# Patient Record
Sex: FEMALE | Race: BLACK OR AFRICAN AMERICAN
[De-identification: names, ages, dates, MRNs, and addresses within clinical notes are randomized per-mention and may not be internally consistent; named-entity substitution may affect disease eponyms.]

---
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
## 2023-10-21 ENCOUNTER — HOSPITAL ENCOUNTER (EMERGENCY)
Dept: HOSPITAL 26 - MED | Age: 23
Discharge: HOME | End: 2023-10-21
Payer: COMMERCIAL

## 2023-10-21 VITALS
HEART RATE: 118 BPM | TEMPERATURE: 97.6 F | SYSTOLIC BLOOD PRESSURE: 117 MMHG | DIASTOLIC BLOOD PRESSURE: 72 MMHG | RESPIRATION RATE: 17 BRPM | OXYGEN SATURATION: 100 %

## 2023-10-21 VITALS — HEIGHT: 72 IN | WEIGHT: 177.5 LBS | BODY MASS INDEX: 24.04 KG/M2

## 2023-10-21 VITALS
SYSTOLIC BLOOD PRESSURE: 117 MMHG | TEMPERATURE: 97.6 F | OXYGEN SATURATION: 100 % | RESPIRATION RATE: 17 BRPM | DIASTOLIC BLOOD PRESSURE: 72 MMHG | HEART RATE: 118 BPM

## 2023-10-21 DIAGNOSIS — J45.909: ICD-10-CM

## 2023-10-21 DIAGNOSIS — Z20.822: ICD-10-CM

## 2023-10-21 DIAGNOSIS — J10.1: Primary | ICD-10-CM

## 2023-10-21 DIAGNOSIS — Z79.899: ICD-10-CM

## 2023-10-21 LAB
FLUAV AG UPPER RESP QL IA.RAPID: POSITIVE
FLUBV AG UPPER RESP QL IA.RAPID: POSITIVE